# Patient Record
Sex: FEMALE | Race: WHITE
[De-identification: names, ages, dates, MRNs, and addresses within clinical notes are randomized per-mention and may not be internally consistent; named-entity substitution may affect disease eponyms.]

---

## 2018-04-02 ENCOUNTER — HOSPITAL ENCOUNTER (EMERGENCY)
Dept: HOSPITAL 39 - ER | Age: 53
Discharge: HOME | End: 2018-04-02
Payer: COMMERCIAL

## 2018-04-02 VITALS — TEMPERATURE: 98.2 F | OXYGEN SATURATION: 100 %

## 2018-04-02 VITALS — DIASTOLIC BLOOD PRESSURE: 79 MMHG | SYSTOLIC BLOOD PRESSURE: 134 MMHG

## 2018-04-02 DIAGNOSIS — S66.912A: ICD-10-CM
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
## 2018-04-02 DIAGNOSIS — Z79.4: ICD-10-CM
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
## 2018-04-02 DIAGNOSIS — E07.9: ICD-10-CM

## 2018-04-02 DIAGNOSIS — E11.9: ICD-10-CM

## 2018-04-02 DIAGNOSIS — S63.92XA: Primary | ICD-10-CM

## 2018-04-02 NOTE — ED.PDOC
History of Present Illness





- General


Chief Complaint: Upper Extremity Injury


Stated Complaint: lt wrist pain from fall


Time Seen by Provider: 04/02/18 12:25


Source: patient


Exam Limitations: no limitations





- History of Present Illness


Initial Comments: 





the patient is a 52-year-old  female presenting to the emergency room 

secondary to left hand and wrist pain after a fall yesterday.  The patient 

simply tripped and fell.  She does have some mild bruising towards the base of 

the thumb.  No gross deformity.  She has tenderness to palpation over the base 

of the thumb as well.  Questionable pain in the anatomic snuffbox. No crepitus.

  She is neurovascularly preserved.  No other injuries.  She does have some 

mild pain at the distalradius as well.  No deformity there.


Timing/Duration: 24 hours


Severity: moderate


Improving Factors: nothing


Worsening Factors: movement


Associated Symptoms: denies symptoms


Allergies/Adverse Reactions: 


Allergies





NO KNOWN ALLERGY Allergy (Verified 04/02/18 12:42)


 








Home Medications: 


Ambulatory Orders





Insulin Glargine [Lantus Solostar] 17 unit SC DAILY 04/02/18 


Insulin Lispro [Humalog]  04/02/18 


Levothyroxine Sodium [Synthroid] 100 mcg PO DAILY 04/02/18 











Review of Systems





- Review of Systems


Constitutional: States: no symptoms reported


EENTM: States: no symptoms reported


Respiratory: States: no symptoms reported


Cardiology: States: no symptoms reported


Gastrointestinal/Abdominal: States: no symptoms reported


Genitourinary: States: no symptoms reported


Musculoskeletal: States: see HPI


Skin: States: see HPI


Neurological: States: no symptoms reported


Endocrine: States: no symptoms reported


Hematologic/Lymphatic: States: no symptoms reported


All other Systems: No Change from Baseline





Past Medical History (General)





- Patient Medical History


Hx Seizures: No


Hx Stroke: No


Hx Dementia: No


Hx Asthma: No


Hx of COPD: No


Hx Cardiac Disorders: No


Hx Congestive Heart Failure: No


Hx Pacemaker: No


Hx Hypertension: No


Hx Thyroid Disease: Yes


Hx Diabetes: Yes


Hx Gastroesophageal Reflux: No


Hx Renal Disease: No


Hx Cancer: No


Hx of HIV: No


Hx MRSA: No





- Vaccination History


Hx Tetanus, Diphtheria Vaccination: Yes


Hx Influenza Vaccination: No


Hx Pneumococcal Vaccination: No


Immunizations Up to Date: Yes





- Social History


Hx Tobacco Use: No


Hx Alcohol Use: Yes


Hx Depression: No





- Female History


Patient is a Female of Child Bearing Age (10 -59 yrs old): No





Family Medical History





- Family History


  ** Mother


Family History: No Known





Physical Exam





- Physical Exam


General Appearance: Alert, Comfortable, No apparent distress


Eye Exam: bilateral normal


Ears, Nose, Throat: hearing grossly normal


Neck: full range of motion


Respiratory: no respiratory distress, no accessory muscle use


Cardiovascular/Chest: normal peripheral pulses, no edema


Peripheral Pulses: radial,right: 2+, radial,left: 2+


Rectal Exam: deferred


Back Exam: normal inspection


Extremity: normal range of motion, no pedal edema, no calf tenderness, normal 

capillary refill, other - see history of present illness.  The patient has 

normal active and passive range of motion of the hand and wrist.  Strength is 

preserved.  She is neurovascularly intact.  No lacerations.


Neurologic: CNs II-XII nml as tested, no motor/sensory deficits, alert, normal 

mood/affect, oriented x 3


Skin Exam: normal color - mild bruising as above at the injury site


Comments: 





 Vital Signs - 24 hr











  04/02/18





  12:35


 


Temperature 98.2 F


 


Pulse Rate [ 76





Left Brachial] 


 


Respiratory 20





Rate 


 


Blood Pressure 125/81





[lt arm] 


 


O2 Sat by Pulse 100





Oximetry 














Progress





- Progress


Progress: 





04/02/18 13:49


the patient's a 52-year-old  female presenting to the emergency room 

secondary to a fall with left hand and wrist pain yesterday.  The patient has 

some mild bruising but x-ray of the wrist and hand show no evidence of any 

fracture or dislocation.  The patient will be placed in a thumb spica for a 

week.  Ibuprofen can be used for pain.  ER warnings were given for any 

significant worsening.  If the pain is persistent or worsening over the next 

week then a repeat x-ray may be warranted to rule out any hairline fracture 

missed today.





Departure





- Departure


Clinical Impression: 


 Sprain and strain of hand





Disposition: Discharge to Home or Self Care


Condition: Fair


Departure Forms:  ED Discharge - Pt. Copy, Patient Portal Self Enrollment


Instructions:  DI for Arm Pain, DI for Hand Pain


Diet: regular diet


Activity: no pushing/pulling with affected limb


Home Medications: 


Ambulatory Orders





Insulin Glargine [Lantus Solostar] 17 unit SC DAILY 04/02/18 


Insulin Lispro [Humalog]  04/02/18 


Levothyroxine Sodium [Synthroid] 100 mcg PO DAILY 04/02/18 








Additional Instructions: 


the patient's a 52-year-old  female presenting to the emergency room 

secondary to a fall with left hand and wrist pain yesterday.  The patient has 

some mild bruising but x-ray of the wrist and hand show no evidence of any 

fracture or dislocation.  The patient will be placed in a thumb spica for a 

week.  Ibuprofen can be used for pain.  ER warnings were given for any 

significant worsening.  If the pain is persistent or worsening over the next 

week then a repeat x-ray may be warranted to rule out any hairline fracture 

missed today.

## 2018-04-02 NOTE — RAD
EXAM DESCRIPTION: 



Wrist,Left 2 Views



CLINICAL HISTORY: 



fall, pain to radial wrist and prox hand



COMPARISON: 



None.



TECHNIQUE: 



AP and lateral left



FINDINGS: 



I see no bone joint or soft tissue abnormality.



IMPRESSION: 



No fracturing is detected.



Electronically signed by:  Kendall Brothers MD  4/2/2018 1:42 PM

CDT Workstation: [id number]

## 2018-04-02 NOTE — RAD
EXAM DESCRIPTION: 



Hand,Left 3 Views



CLINICAL HISTORY: 



fall, pain to radial wrist and prox hand



COMPARISON: 



None.



TECHNIQUE: 



3 views left



FINDINGS: 



I see no bone joint or soft tissue abnormality.



IMPRESSION: 



Normal left hand.



Electronically signed by:  Kendall Brothers MD  4/2/2018 1:43 PM

CDT Workstation: [id number]